# Patient Record
Sex: MALE | Race: WHITE | NOT HISPANIC OR LATINO | Employment: FULL TIME | URBAN - METROPOLITAN AREA
[De-identification: names, ages, dates, MRNs, and addresses within clinical notes are randomized per-mention and may not be internally consistent; named-entity substitution may affect disease eponyms.]

---

## 2021-12-01 ENCOUNTER — OFFICE VISIT (OUTPATIENT)
Dept: FAMILY MEDICINE | Facility: CLINIC | Age: 41
End: 2021-12-01
Payer: COMMERCIAL

## 2021-12-01 VITALS
SYSTOLIC BLOOD PRESSURE: 157 MMHG | DIASTOLIC BLOOD PRESSURE: 110 MMHG | TEMPERATURE: 99.4 F | RESPIRATION RATE: 12 BRPM | HEART RATE: 119 BPM | OXYGEN SATURATION: 97 %

## 2021-12-01 DIAGNOSIS — R05.9 COUGH: ICD-10-CM

## 2021-12-01 DIAGNOSIS — R11.0 NAUSEA: Primary | ICD-10-CM

## 2021-12-01 DIAGNOSIS — J01.90 ACUTE BACTERIAL RHINOSINUSITIS: ICD-10-CM

## 2021-12-01 DIAGNOSIS — B96.89 ACUTE BACTERIAL RHINOSINUSITIS: ICD-10-CM

## 2021-12-01 DIAGNOSIS — R50.9 FEVER, UNSPECIFIED FEVER CAUSE: ICD-10-CM

## 2021-12-01 LAB — DEPRECATED S PYO AG THROAT QL EIA: NEGATIVE

## 2021-12-01 PROCEDURE — U0005 INFEC AGEN DETEC AMPLI PROBE: HCPCS | Performed by: NURSE PRACTITIONER

## 2021-12-01 PROCEDURE — 87651 STREP A DNA AMP PROBE: CPT | Performed by: NURSE PRACTITIONER

## 2021-12-01 PROCEDURE — 99214 OFFICE O/P EST MOD 30 MIN: CPT | Performed by: NURSE PRACTITIONER

## 2021-12-01 PROCEDURE — U0003 INFECTIOUS AGENT DETECTION BY NUCLEIC ACID (DNA OR RNA); SEVERE ACUTE RESPIRATORY SYNDROME CORONAVIRUS 2 (SARS-COV-2) (CORONAVIRUS DISEASE [COVID-19]), AMPLIFIED PROBE TECHNIQUE, MAKING USE OF HIGH THROUGHPUT TECHNOLOGIES AS DESCRIBED BY CMS-2020-01-R: HCPCS | Performed by: NURSE PRACTITIONER

## 2021-12-01 RX ORDER — DOXYCYCLINE 100 MG/1
100 CAPSULE ORAL 2 TIMES DAILY
Qty: 14 CAPSULE | Refills: 0 | Status: SHIPPED | OUTPATIENT
Start: 2021-12-01 | End: 2021-12-08

## 2021-12-01 RX ORDER — SULFAMETHOXAZOLE/TRIMETHOPRIM 800-160 MG
TABLET ORAL
COMMUNITY
Start: 2021-02-25

## 2021-12-01 RX ORDER — ONDANSETRON 4 MG/1
4 TABLET, FILM COATED ORAL EVERY 8 HOURS PRN
Qty: 6 TABLET | Refills: 0 | Status: SHIPPED | OUTPATIENT
Start: 2021-12-01

## 2021-12-01 RX ORDER — TRIAMTERENE/HYDROCHLOROTHIAZID 37.5-25 MG
TABLET ORAL
COMMUNITY
Start: 2021-11-10

## 2021-12-01 RX ORDER — ALBUTEROL SULFATE 90 UG/1
AEROSOL, METERED RESPIRATORY (INHALATION)
COMMUNITY
Start: 2021-02-22

## 2021-12-01 RX ORDER — DILTIAZEM HYDROCHLORIDE 180 MG/1
CAPSULE, COATED, EXTENDED RELEASE ORAL
COMMUNITY
Start: 2021-11-10

## 2021-12-01 ASSESSMENT — ENCOUNTER SYMPTOMS
SINUS PAIN: 0
FATIGUE: 1
SINUS PRESSURE: 0
SHORTNESS OF BREATH: 0
SORE THROAT: 1
NERVOUS/ANXIOUS: 1

## 2021-12-02 LAB
GROUP A STREP BY PCR: NOT DETECTED
SARS-COV-2 RNA RESP QL NAA+PROBE: NEGATIVE

## 2021-12-02 NOTE — PROGRESS NOTES
"Assessment & Plan     Cough    - Streptococcus A Rapid Screen w/Reflex to PCR - Clinic Collect  - XR Chest 2 Views  - Symptomatic COVID-19 Virus (Coronavirus) by PCR Nose  - Group A Streptococcus PCR Throat Swab    Fever, unspecified fever cause    - Streptococcus A Rapid Screen w/Reflex to PCR - Clinic Collect  - XR Chest 2 Views  - Symptomatic COVID-19 Virus (Coronavirus) by PCR Nose  - Group A Streptococcus PCR Throat Swab    Acute bacterial rhinosinusitis    - doxycycline hyclate (VIBRAMYCIN) 100 MG capsule  Dispense: 14 capsule; Refill: 0    Nausea    - ondansetron (ZOFRAN) 4 MG tablet  Dispense: 6 tablet; Refill: 0     Patient with clear rhinorrhea and nasal congestion which progressed to green after 4 days of symptoms.  Is having persistent postnasal drainage which is associated with nausea and sore throat.  Strep test is negative.  The day following change in drainage color, he developed a 101.4 fever.    Minimal cough.    Covid screening.     Chest x-ray is negative.  Given fever that started with change in nasal drainage color after 4 days of viral illness, will start him on doxycycline for ABRS with severe symptoms.  Mucinex, Afrin if needed.    Does request something besides Pepto-Bismol for nausea.  Will give a few tabs of Zofran.    Recheck if not better in about 4 days.            No follow-ups on file.    Mirian Ohara, CNP  [facility]    Haley Matos is a 49 year old male who presents to clinic today for the following health issues:  Chief Complaint   Patient presents with     Fever     diarrhea, nausea, congestion, nasal drip, chills (exposure to bronchitis)       HPI    Was exposed to parents who both had \"bronchitis.\"  They had neg covid tests.  Cough - occasional with CP, worsening nausea, nasal drip, fever.     +Asthma - using inhalers, only uses when sick.      +Htn hx.     + Booster 2-3 days before sx started.      Temp was 101.4 today.  No fever prior.  "     ++PND with nausea ++ Green - started last couple of days - was clear to start.  No facial pain.  Also has a history of seasonal allergies that are flaring up.  Nausea is related to constant postnasal drainage.  Has been using Pepto-Bismol for this.    Not short of breath.  Uses CPAP at night - can't use due to plugged nose.           Review of Systems   Constitutional: Positive for fatigue.   HENT: Positive for sore throat. Negative for ear pain, sinus pressure and sinus pain.    Respiratory: Negative for shortness of breath.    Allergic/Immunologic: Positive for environmental allergies.   Psychiatric/Behavioral: The patient is nervous/anxious (Due to Dr. Singh).            Objective    BP (!) 157/110 (BP Location: Right arm, Patient Position: Sitting, Cuff Size: Adult Regular)   Pulse 119   Temp 99.4  F (37.4  C) (Oral)   Resp 12   SpO2 97%   Physical Exam  Constitutional:       Appearance: Normal appearance. He is diaphoretic. He is not ill-appearing.   HENT:      Nose: Congestion present.      Mouth/Throat:      Pharynx: Posterior oropharyngeal erythema present.      Tonsils: No tonsillar exudate. 2+ on the right. 2+ on the left.   Cardiovascular:      Rate and Rhythm: Tachycardia present.      Pulses: Normal pulses.      Heart sounds: Normal heart sounds.   Pulmonary:      Effort: Pulmonary effort is normal. No respiratory distress.      Breath sounds: Normal breath sounds. No wheezing.   Skin:     General: Skin is warm.   Neurological:      Mental Status: He is alert.   Psychiatric:         Mood and Affect: Mood normal.         Behavior: Behavior normal.         Thought Content: Thought content normal.         Judgment: Judgment normal.            Results for orders placed or performed in visit on 12/01/21 (from the past 24 hour(s))   Streptococcus A Rapid Screen w/Reflex to PCR - Clinic Collect    Specimen: Throat; Swab   Result Value Ref Range    Group A Strep antigen Negative Negative   XR Chest 2  Views    Narrative    EXAM DATE:         12/01/2021    EXAM: X-RAY CHEST, 2 VIEWS, FRONTAL AND LATERAL  LOCATION: [facility]  DATE/TIME: 12/1/2021 7:45 PM    INDICATION: Cough with fever on day 5 of illness  COMPARISON: None    IMPRESSION: Negative chest.

## 2021-12-02 NOTE — PATIENT INSTRUCTIONS
Recommend Mucinex for nasal congestion or generic is guaifenesin  Afrin nasal spray to clear nasal passages for up to 3 days.    Doxycycline for postnasal drainage and fever.    You still have a fever after a total of 48 hours of treatment or your nasal drainage is not much better after 4 days, please be rechecked in your clinic.  Call [address] [place] if you do not have a clinic.      Patient Education     Acute Bacterial Rhinosinusitis (ABRS)    Acute bacterial rhinosinusitis (ABRS) is an infection of your nasal cavity and sinuses. It s caused by bacteria. Acute means that you ve had symptoms for less than 4 weeks, but possibly up to 12 weeks.  Understanding your sinuses  The nasal cavity is the large air-filled space behind your nose. The sinuses are a group of spaces formed by the bones of your face. They connect with your nasal cavity. ABRS causes the tissue lining these spaces to become inflamed. Mucus may not drain normally. This leads to facial pain and other symptoms.  What causes ABRS?  ABRS most often follows an upper respiratory infection caused by a virus. Bacteria then infect the lining of your nasal cavity and sinuses. But you can also get ABRS if you have:    Nasal allergies    Long-term nasal swelling and congestion not caused by allergies    Blockage in the nose  Symptoms of ABRS  The symptoms of ABRS may be different for each person and include:    Nasal congestion or blockage    Pain or pressure in the face    Thick, colored drainage from the nose  Other symptoms may include:    Runny nose    Fluid draining from the nose down the throat (postnasal drip)    Headache    Cough    Pain    Fever  Diagnosing ABRS  ABRS may be diagnosed if you ve had an upper respiratory infection like a cold and cough for 10 or more days without improvement or with worsening symptoms. Your healthcare provider will ask about your symptoms and your medical history. The provider will check your vital signs, including your  temperature. You ll have a physical exam. The healthcare provider will check your ears, nose, and throat. You likely won t need any tests. If ABRS comes back, you may have a culture or other tests.  Treatment for ABRS  Treatment may include:    Antibiotic medicine. This is for symptoms that last for at least 10 to 14 days.    Nasal corticosteroid medicine. Drops or spray used in the nose can lessen swelling and congestion.    Over-the-counter pain medicine. This is to lessen sinus pain and pressure.    Nasal decongestant medicine. Spray or drops may help to lessen congestion. Do not use them for more than a few days.    Salt wash (saline irrigation). This can help to loosen mucus.  Possible complications of ABRS  ABRS may come back or become long-term (chronic). In rare cases, ABRS may cause complications such as:     Inflamed tissue around the brain and spinal cord (meningitis)    Inflamed tissue around the eyes (orbital cellulitis)    Inflamed bones around the sinuses (osteitis)  These problems may need to be treated in a hospital with intravenous (IV) antibiotic medicine or surgery.  When to call the healthcare provider  Call your healthcare provider if you have any of the following:    Symptoms that don t get better, or get worse    Symptoms that don t get better after 3 to 5 days on antibiotics    Trouble seeing    Swelling around your eyes    Confusion or trouble staying awake   [employer]har[employer] last reviewed this educational content on 5/1/2017 2000-2021 [employer]. All rights reserved. This information is not intended as a substitute for professional medical care. Always follow your healthcare professional's instructions.

## 2021-12-12 ENCOUNTER — HEALTH MAINTENANCE LETTER (OUTPATIENT)
Age: 41
End: 2021-12-12

## 2022-09-04 ENCOUNTER — HEALTH MAINTENANCE LETTER (OUTPATIENT)
Age: 42
End: 2022-09-04

## 2023-01-15 ENCOUNTER — HEALTH MAINTENANCE LETTER (OUTPATIENT)
Age: 43
End: 2023-01-15

## 2024-02-17 ENCOUNTER — HEALTH MAINTENANCE LETTER (OUTPATIENT)
Age: 44
End: 2024-02-17

## 2024-05-19 ENCOUNTER — OFFICE VISIT (OUTPATIENT)
Dept: URGENT CARE | Facility: URGENT CARE | Age: 44
End: 2024-05-19
Payer: COMMERCIAL

## 2024-05-19 VITALS
DIASTOLIC BLOOD PRESSURE: 74 MMHG | OXYGEN SATURATION: 98 % | WEIGHT: 209 LBS | TEMPERATURE: 98.5 F | HEART RATE: 92 BPM | SYSTOLIC BLOOD PRESSURE: 112 MMHG

## 2024-05-19 DIAGNOSIS — L02.92 BOIL: Primary | ICD-10-CM

## 2024-05-19 PROCEDURE — 99213 OFFICE O/P EST LOW 20 MIN: CPT | Performed by: FAMILY MEDICINE

## 2024-05-19 RX ORDER — FLUOXETINE 40 MG/1
CAPSULE ORAL
COMMUNITY

## 2024-05-19 RX ORDER — SEMAGLUTIDE 1.7 MG/.75ML
INJECTION, SOLUTION SUBCUTANEOUS
COMMUNITY
Start: 2024-04-25

## 2024-05-19 RX ORDER — LISDEXAMFETAMINE DIMESYLATE 20 MG/1
CAPSULE ORAL
COMMUNITY
Start: 2024-04-15

## 2024-05-19 RX ORDER — LOSARTAN POTASSIUM 25 MG/1
TABLET ORAL
COMMUNITY
Start: 2023-05-22

## 2024-05-19 RX ORDER — SULFAMETHOXAZOLE/TRIMETHOPRIM 800-160 MG
1 TABLET ORAL 2 TIMES DAILY
Qty: 14 TABLET | Refills: 0 | Status: SHIPPED | OUTPATIENT
Start: 2024-05-19 | End: 2024-05-26

## 2024-05-19 NOTE — PATIENT INSTRUCTIONS
Bactrim twice daily for 7 days to treat the infected area on the back of your left leg.    Use ibuprofen or Tylenol as needed to help with soreness.  Ibuprofen will be more effective at reducing inflammation than Tylenol.

## 2024-05-19 NOTE — PROGRESS NOTES
ICD-10-CM    1. Boil  L02.92 sulfamethoxazole-trimethoprim (BACTRIM DS) 800-160 MG tablet        Looks like this started with an ingrown hair.  No abscess ready for I&D at today's visit.  Will cover for MRSA with Bactrim.    PLAN:  Patient Instructions   Bactrim twice daily for 7 days to treat the infected area on the back of your left leg.    Use ibuprofen or Tylenol as needed to help with soreness.  Ibuprofen will be more effective at reducing inflammation than Tylenol.    SUBJECTIVE:  Mani Gonzales is a 43 year old male who presents to  today with worsening boil on the back of the left thigh.  This area got irritated about a month ago while he was driving back from [place] and in the last few days is seems to be larger and more painful.  It's right where his leg hits the seat when he sits down, so it is getting irritated often.  No fevers.    OBJECTIVE:  /74   Pulse 92   Temp 98.5  F (36.9  C)   Wt 94.8 kg (209 lb)   SpO2 98%   GEN: well-appearing, in NAD  Skin:  posterior lower left thigh with 3 cm wide [place] of erythema surrounding a central pustule with minimal induration and no drainage.  No red streaks extending from the erythematous patch.

## 2025-03-09 ENCOUNTER — HEALTH MAINTENANCE LETTER (OUTPATIENT)
Age: 45
End: 2025-03-09